# Patient Record
Sex: MALE | Race: WHITE
[De-identification: names, ages, dates, MRNs, and addresses within clinical notes are randomized per-mention and may not be internally consistent; named-entity substitution may affect disease eponyms.]

---

## 2017-01-06 ENCOUNTER — HOSPITAL ENCOUNTER (OUTPATIENT)
Dept: HOSPITAL 61 - KCIC MRI | Age: 42
Discharge: HOME | End: 2017-01-06
Attending: NURSE PRACTITIONER
Payer: COMMERCIAL

## 2017-01-06 DIAGNOSIS — R51: Primary | ICD-10-CM

## 2017-01-06 PROCEDURE — 70551 MRI BRAIN STEM W/O DYE: CPT

## 2017-01-06 NOTE — [facility]
PROCEDURE 

MRI of the brain without contrast 01/06/2017 

 

HISTORY 

Chronic intractable headaches for 1 month. 

 

TECHNIQUE 

Unenhanced T1 weighted sagittal and axial, T2 weighted axial coronal and 

FLAIR, gradient echo and diffusion weighted axial images of the brain were

obtained. 

 

FINDINGS 

The ventricles and sulci are within normal limits in size and 

configuration. An area of decreased signal intensity is seen on the 

gradient echo images involving the inferior right frontal/anterior 

temporal lobe near the right sylvian fissure. This appears to bloom on the

gradient echo images. It appears to correspond to a 4mm area of decreased 

signal intensity on the T1 weighted and T2 weighted images. There is no 

surrounding edema or associated mass effect. 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 

represent a cavernous angioma. No acute parenchymal abnormality is seen. 

No extra-axial fluid collection is noted. There is no MRI evidence of 

acute ischemia/infarction. 

Mild mucosal thickening is seen involving the maxillary sinuses and 

scattered throughout the ethmoid air cells bilaterally. Very mild mucosal 

thickening is seen involving both frontal sinuses. Mild to moderate 

mucosal thickening is seen along the sphenoid sinus. Normal flow voids are

seen within the major vascular structures surrounding the brain 

parenchyma. 

 

IMPRESSION 

No acute parenchymal abnormality is seen. 

 

Electronically signed by: Riccardo Tian MD (Jan 06, 2017 15:36:37)

## 2019-04-22 ENCOUNTER — HOSPITAL ENCOUNTER (OUTPATIENT)
Dept: HOSPITAL 61 - KCIC CT | Age: 44
Discharge: HOME | End: 2019-04-22
Attending: INTERNAL MEDICINE
Payer: COMMERCIAL

## 2019-04-22 DIAGNOSIS — R10.30: Primary | ICD-10-CM

## 2019-04-22 PROCEDURE — 74177 CT ABD & PELVIS W/CONTRAST: CPT

## 2019-04-22 NOTE — [facility]
Examination: CT ABD PELV W/ORAL IV CONTRAST

 

History: Abdominal pain

 

Comparison/Correlation: None

 

Findings: Axial images of the abdomen and pelvis were obtained following 

IV contrast. Sagittal and coronal reformatted images were provided. Oral 

contrast was provided.

 

Visualized lung bases are clear.

 

Liver, spleen, pancreas, adrenal glands, and kidneys are normal. 

Gallbladder fossa is unremarkable.

 

No ascites or pelvic free fluid. No extraluminal gas. No inflammatory 

changes about the cecum. Appendix is not identified. There is no umbilical

hernia. No bowel obstruction.

 

Bony structures are unremarkable.

 

 

Impression:

No acute process. Unremarkable exam.

 

 

PQRS Compliance Statement:

 

One or more of the following individualized dose reduction techniques were

utilized for this examination:  

1. Automated exposure control  

2. Adjustment of the mA and/or kV according to patient size  

3. Use of iterative reconstruction technique

 

Electronically signed by: Evangelista Otero MD (4/22/2019 5:00 PM) [facility]